# Patient Record
Sex: MALE | Race: WHITE
[De-identification: names, ages, dates, MRNs, and addresses within clinical notes are randomized per-mention and may not be internally consistent; named-entity substitution may affect disease eponyms.]

---

## 2018-01-24 ENCOUNTER — HOSPITAL ENCOUNTER (OUTPATIENT)
Dept: HOSPITAL 77 - KA.SDS | Age: 68
Discharge: HOME | End: 2018-01-24
Attending: FAMILY MEDICINE
Payer: MEDICARE

## 2018-01-24 VITALS — SYSTOLIC BLOOD PRESSURE: 142 MMHG | DIASTOLIC BLOOD PRESSURE: 92 MMHG

## 2018-01-24 DIAGNOSIS — K80.10: ICD-10-CM

## 2018-01-24 DIAGNOSIS — E03.9: ICD-10-CM

## 2018-01-24 DIAGNOSIS — E66.9: ICD-10-CM

## 2018-01-24 DIAGNOSIS — Z79.899: ICD-10-CM

## 2018-01-24 DIAGNOSIS — E78.00: ICD-10-CM

## 2018-01-24 DIAGNOSIS — F17.210: ICD-10-CM

## 2018-01-24 DIAGNOSIS — D13.5: Primary | ICD-10-CM

## 2018-01-24 DIAGNOSIS — N40.1: ICD-10-CM

## 2018-01-24 PROCEDURE — 93005 ELECTROCARDIOGRAM TRACING: CPT

## 2018-01-24 PROCEDURE — 94640 AIRWAY INHALATION TREATMENT: CPT

## 2018-01-24 PROCEDURE — 71046 X-RAY EXAM CHEST 2 VIEWS: CPT

## 2018-01-24 PROCEDURE — 47562 LAPAROSCOPIC CHOLECYSTECTOMY: CPT

## 2018-01-24 NOTE — PCM.OPNOTE
- General Post-Op/Procedure Note


Date of Surgery/Procedure: 01/24/18


Operative Procedure(s): Laparroscopic Cholecystectomy.


Findings: 


Moderately chronically inflamed gallbladder with stones.





Pre Op Diagnosis: Chronic cholecystitis and cholelithiasis.


Primary Surgeon: Peña Zavala


Complications: None


Condition: Good


Free Text/Narrative:: 


INFORMED CONSENT: This patient is here today because of chronic cholecystitis 

and cholelithiasis. The operative procedure is laparoscopic cholecystectomy. 

The operative procedure and risks were discussed including all possible 

complications including infection, pain, bleeding, bile duct injury, internal 

organ injury, conversion to open procedure, reoperation, PE, death. Anesthetic 

complications were handled by CRNA.  The patient understands well and wishes to 

proceed.





OPERATION PERFORMED:  Laparoscopic cholecystectomy.





PROCEDURE:  The patient was kept in the supine position and a satisfactory 

general anesthetic was administered via endotracheal tube. The abdomen was 

thoroughly prepped and draped in the usual fashion. The supraumbilical fold was 

infiltrated with 1 mL of Marcaine 0.5% and a curvilinear incision was made. The 

incision was deepened through the subcutaneous tissue until we came down upon 

the fascial layer. We then held the fascial layer with two Kocher clamps and 

then introduced a Verre's needle directly into the abdominal cavity. The 

position of the needle was ascertained by the aspiration of a small quantity of 

air, free flow of saline, negative aspiration of blood. We then instilled CO2 

gas into the abdominal cavity and developed an abdominal pressure of 

approximately 15 mm/Hg. At this point we removed the Verre's needle and 

reintroduced it over a sheath. This was followed by a 5/12.5 trocar and a 

camera. Inspection revealed a chronically inflamed gallbladder with some 

omental adhesions. The omental pad was quite thick. The rest of the abdominal 

contents were normal to visualization. Two 5 mm trocars were placed in the 

right hypochondriac region, one in the right midclavicular and the second on 

the anterior clavicle line and a third 11.5 trocar was inserted in the 

subxiphoid position under direct vision. The patient was then kept in the 

reverse Trendelenburg position with a slight tilt to the left side. The two 

ratchets were placed one on the fundus and one at the neck of the gallbladder. 

Dissection was begun at the neck of the gallbladder and the fat in this area 

was quite immense. We carefully dissected out the cystic artery and cystic duct 

separately and encircled both structures at approximately 1 to 1-1/2 cm. The 

critical view [place] was clearly obtained. We ligated each structure 

separately using endo clips. When ligating the cystic duct we made sure not to 

encroach upon the common duct in any way. We then used a hook cautery and 

gently dissected the gallbladder off its bed. Because of his chronically 

adherent nature the entire gallbladder posterior wall could not be removed. The

  gallbladder fossa and remnant of the posterior wall were gently cauterized 

for additional hemostasis. The gallbladder with stones was retrieved through 

the umbilical port via a Endobag. The ministerio hepatis was thoroughly irrigated 

with normal saline, most of which was aspirated out. The abdomen was then 

slowly deflated and prior to removal of the last trocar we completely deflated 

the abdomen. The trocar sites were washed with Betadine solution and the 

fascial layer was closed with 0 Polysorb and the skin was closed using 4.0 

Polysorb in a subcuticular fashion. We then instilled approximately 20 mL of 

Marcaine 0.5% with epinephrine between the four sites. Sterile pressure 

dressings were applied. The patient tolerated the procedure well. There were no 

operative complications. Sponge, needle, and instrument count were correct.

## 2023-08-22 ENCOUNTER — HOSPITAL ENCOUNTER (EMERGENCY)
Dept: HOSPITAL 77 - KA.ED | Age: 73
Discharge: HOME | End: 2023-08-22
Payer: MEDICARE

## 2023-08-22 VITALS — SYSTOLIC BLOOD PRESSURE: 181 MMHG | HEART RATE: 63 BPM | DIASTOLIC BLOOD PRESSURE: 107 MMHG

## 2023-08-22 DIAGNOSIS — Z79.899: ICD-10-CM

## 2023-08-22 DIAGNOSIS — S46.912A: Primary | ICD-10-CM
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
## 2023-08-22 DIAGNOSIS — E78.00: ICD-10-CM

## 2023-08-22 DIAGNOSIS — E03.9: ICD-10-CM
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
